# Patient Record
Sex: FEMALE | Race: WHITE | HISPANIC OR LATINO | URBAN - METROPOLITAN AREA
[De-identification: names, ages, dates, MRNs, and addresses within clinical notes are randomized per-mention and may not be internally consistent; named-entity substitution may affect disease eponyms.]

---
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
## 2020-01-09 ENCOUNTER — APPOINTMENT (RX ONLY)
Dept: URBAN - METROPOLITAN AREA CLINIC 176 | Facility: CLINIC | Age: 20
Setting detail: DERMATOLOGY
End: 2020-01-09

## 2020-01-09 DIAGNOSIS — Z41.9 ENCOUNTER FOR PROCEDURE FOR PURPOSES OTHER THAN REMEDYING HEALTH STATE, UNSPECIFIED: ICD-10-CM

## 2020-01-09 PROCEDURE — ? CHEMICAL PEEL

## 2020-01-09 NOTE — PROCEDURE: CHEMICAL PEEL
Detail Level: Simple
Treatment Number: 0
Consent: Prior to the procedure, written consent was obtained and risks were reviewed, including but not limited to: redness, peeling, blistering, pigmentary change, scarring, infection, and pain.
Chemical Peel: Glytone 30 Peel
Post-Care Instructions: I reviewed with the patient in detail post-care instructions. Patient should avoid sun exposure and wear sun protection.
Prep: Obagi Cleanser, Glytone prep pad, Glytone Glycolic peel 30% x 3 min and neutralized, applied Glytone Lipid Recovery cream and Glytone SPF 40
Post Peel Care: After the procedure, . Sun protection and post-care instructions were reviewed with the patient
Price (Use Numbers Only, No Special Characters Or $): 100

## 2020-01-22 ENCOUNTER — APPOINTMENT (RX ONLY)
Dept: URBAN - METROPOLITAN AREA CLINIC 176 | Facility: CLINIC | Age: 20
Setting detail: DERMATOLOGY
End: 2020-01-22

## 2020-01-22 DIAGNOSIS — L70.0 ACNE VULGARIS: ICD-10-CM

## 2020-01-22 PROCEDURE — ? CHEMICAL PEEL

## 2020-01-22 NOTE — PROCEDURE: CHEMICAL PEEL
Detail Level: Simple
Price (Use Numbers Only, No Special Characters Or $): 50
Consent: Prior to the procedure, written consent was obtained and risks were reviewed, including but not limited to: redness, peeling, blistering, pigmentary change, scarring, infection, and pain.
Prep: Obagi Cleanser, Glytone prep pad, Glytone Glycolic peel 50% x 3 min and neutralized, applied Glytone Lipid Recovery cream and Glytone SPF 40
Post-Care Instructions: I reviewed with the patient in detail post-care instructions. Patient should avoid sun exposure and wear sun protection.
Post Peel Care: After the procedure, . Sun protection and post-care instructions were reviewed with the patient
Treatment Number: 0
Chemical Peel: Glytone 30 Peel